# Patient Record
(demographics unavailable — no encounter records)

---

## 2024-10-09 NOTE — REVIEW OF SYSTEMS
[Headache] : no headache [Appetite Changes] : no appetite changes [Weakness] : no weakness [Lightheadness] : no lightheadness [Dizziness] : no dizziness

## 2024-10-09 NOTE — DISCUSSION/SUMMARY
[FreeTextEntry1] : Rae presented to [facility] for myalgias s/p physical activity involving upper extremities and back.  Reassurance provided. Usually self limiting pain. Pt educated on improving breathing techniques during exercise to reduce muscle aches.  Ibuprofen dispensed for immediate use. Pt tolerated Safe discharge to class

## 2024-10-09 NOTE — HISTORY OF PRESENT ILLNESS
[de-identified] : muscle aches after working out yesterday [FreeTextEntry6] : Rae presented to [facility] for muscle soreness in arms, upper and lower back x 1 day